# Patient Record
Sex: FEMALE | Race: WHITE | NOT HISPANIC OR LATINO | Employment: PART TIME | URBAN - METROPOLITAN AREA
[De-identification: names, ages, dates, MRNs, and addresses within clinical notes are randomized per-mention and may not be internally consistent; named-entity substitution may affect disease eponyms.]

---

## 2023-10-22 ENCOUNTER — HOSPITAL ENCOUNTER (EMERGENCY)
Facility: HOSPITAL | Age: 28
Discharge: HOME | End: 2023-10-23
Attending: EMERGENCY MEDICINE | Admitting: EMERGENCY MEDICINE
Payer: OTHER MISCELLANEOUS

## 2023-10-22 ENCOUNTER — APPOINTMENT (OUTPATIENT)
Dept: GENERAL RADIOLOGY | Facility: HOSPITAL | Age: 28
End: 2023-10-22
Payer: OTHER MISCELLANEOUS

## 2023-10-22 VITALS
SYSTOLIC BLOOD PRESSURE: 142 MMHG | RESPIRATION RATE: 18 BRPM | HEIGHT: 66 IN | WEIGHT: 225 LBS | DIASTOLIC BLOOD PRESSURE: 99 MMHG | TEMPERATURE: 97.8 F | BODY MASS INDEX: 36.16 KG/M2 | HEART RATE: 77 BPM | OXYGEN SATURATION: 97 %

## 2023-10-22 DIAGNOSIS — S89.92XA LEFT KNEE INJURY, INITIAL ENCOUNTER: Primary | ICD-10-CM

## 2023-10-22 PROCEDURE — 63710000001 ONDANSETRON PER 8 MG: Performed by: EMERGENCY MEDICINE

## 2023-10-22 PROCEDURE — 99283 EMERGENCY DEPT VISIT LOW MDM: CPT

## 2023-10-22 PROCEDURE — 99283 EMERGENCY DEPT VISIT LOW MDM: CPT | Performed by: EMERGENCY MEDICINE

## 2023-10-22 PROCEDURE — 73562 X-RAY EXAM OF KNEE 3: CPT

## 2023-10-22 RX ORDER — HYDROCODONE BITARTRATE AND ACETAMINOPHEN 5; 325 MG/1; MG/1
1 TABLET ORAL ONCE AS NEEDED
Status: DISCONTINUED | OUTPATIENT
Start: 2023-10-22 | End: 2023-10-23 | Stop reason: HOSPADM

## 2023-10-22 RX ORDER — ONDANSETRON 4 MG/1
4 TABLET, FILM COATED ORAL ONCE
Status: COMPLETED | OUTPATIENT
Start: 2023-10-22 | End: 2023-10-22

## 2023-10-22 RX ADMIN — ONDANSETRON HYDROCHLORIDE 4 MG: 4 TABLET, FILM COATED ORAL at 23:34

## 2023-10-22 RX ADMIN — HYDROCODONE BITARTRATE AND ACETAMINOPHEN 1 TABLET: 5; 325 TABLET ORAL at 23:34

## 2023-10-22 NOTE — Clinical Note
[facility] FSED [place] [address] [place] [address] [place] [address]  Phone: [phone]    Anabel Cole was seen and treated in our emergency department on 10/22/2023.  She may return to work on 10/26/2023.         Thank you for choosing [facility].    Vivien Acuna MD

## 2023-10-23 RX ORDER — IBUPROFEN 600 MG/1
600 TABLET ORAL EVERY 8 HOURS PRN
Qty: 15 TABLET | Refills: 0 | Status: SHIPPED | OUTPATIENT
Start: 2023-10-23

## 2023-10-23 NOTE — FSED PROVIDER NOTE
Subjective   History of Present Illness  28 yof complains of left knee pain. The patient reports she slipped at work and landed on her knee. The patient felt a pop. She reports she has had multiple surgeries on her knee before. She denies neck or back injury. She denies numbness or weakness.       Review of Systems   Constitutional: Negative.    Musculoskeletal:  Negative for back pain and neck pain.        Knee pain   Neurological: Negative.  Negative for dizziness, weakness, numbness and headaches.   All other systems reviewed and are negative.      History reviewed. No pertinent past medical history.    Allergies   Allergen Reactions    Amoxicillin Hives and Rash    Penicillins Hives    Doxycycline Nausea And Vomiting and Rash       History reviewed. No pertinent surgical history.    History reviewed. No pertinent family history.    Social History     Socioeconomic History    Marital status: Single   Tobacco Use    Smoking status: Never    Smokeless tobacco: Never   Vaping Use    Vaping Use: Never used   Substance and Sexual Activity    Alcohol use: Never    Drug use: Never    Sexual activity: Defer           Objective   Physical Exam  Vitals reviewed.   Constitutional:       Appearance: Normal appearance.   HENT:      Head: Normocephalic and atraumatic.      Nose: Nose normal.      Mouth/Throat:      Mouth: Mucous membranes are moist.      Pharynx: Oropharynx is clear.   Eyes:      Extraocular Movements: Extraocular movements intact.      Pupils: Pupils are equal, round, and reactive to light.   Cardiovascular:      Rate and Rhythm: Normal rate and regular rhythm.      Pulses: Normal pulses.      Heart sounds: Normal heart sounds.   Pulmonary:      Effort: Pulmonary effort is normal.      Breath sounds: Normal breath sounds.   Musculoskeletal:         General: Swelling and tenderness present. Normal range of motion.      Cervical back: Normal range of motion and neck supple. No tenderness.      Left knee:  Swelling present. No crepitus. Tenderness present. Normal alignment. Normal pulse.   Skin:     General: Skin is warm and dry.      Capillary Refill: Capillary refill takes less than 2 seconds.   Neurological:      General: No focal deficit present.      Mental Status: She is alert and oriented to person, place, and time.      Sensory: No sensory deficit.      Motor: No weakness.         Procedures           ED Course  ED Course as of 10/23/23 0151   Mon Oct 23, 2023   0027 Discussed test results and treatment plan with patient and family.  [BM]      ED Course User Index  [BM] Vivien Acuna MD                                           Medical Decision Making  Workup: XR Knee  Findings: No fracture or dislocation  Presentation most consistent with Knee Sprain.  Patient does not currently demonstrate complications of sprain such as compartment syndrome, arterial or nerve injury.  Disposition: Discharge. Supportive bracing provided. WBAT. RICE. Strict return precautions and instructions to follow up with primary MD within 24-48 hours for further evaluation    Problems Addressed:  Left knee injury, initial encounter: complicated acute illness or injury    Amount and/or Complexity of Data Reviewed  Radiology: ordered.    Risk  Prescription drug management.        Final diagnoses:   Left knee injury, initial encounter       ED Disposition  ED Disposition       ED Disposition   Discharge    Condition   Stable    Comment   --               Freddy Urban MD  [address] [place] [address]  [phone]    Schedule an appointment as soon as possible for a visit in 2 days  re-evaluation         Medication List        New Prescriptions      ibuprofen 600 MG tablet  Commonly known as: ADVIL,MOTRIN  Take 1 tablet by mouth Every 8 (Eight) Hours As Needed (pain).               Where to Get Your Medications        These medications were sent to [facility]/pharmacy #[id number] - [place], [place] - [address]  [place] - [phone]  - [phone] FX  [address] [facility] [place] [address]      Hours: 24-hours Phone: [phone]   ibuprofen 600 MG tablet

## 2023-10-23 NOTE — DISCHARGE INSTRUCTIONS
Rest, ice and elevate your injury. Wear knee immobilizer for 1 week. Weight bearing as tolerated. Follow-up with Dr Urban this week for further evaluation.

## 2023-10-31 ENCOUNTER — OFFICE VISIT (OUTPATIENT)
Dept: ORTHOPEDIC SURGERY | Facility: CLINIC | Age: 28
End: 2023-10-31
Payer: COMMERCIAL

## 2023-10-31 VITALS — WEIGHT: 225 LBS | OXYGEN SATURATION: 99 % | BODY MASS INDEX: 36.16 KG/M2 | RESPIRATION RATE: 20 BRPM | HEIGHT: 66 IN

## 2023-10-31 DIAGNOSIS — M76.50 PATELLAR TENDINOSIS: Primary | ICD-10-CM

## 2023-10-31 NOTE — PROGRESS NOTES
"[facility]        Patient Name: Anabel Cole  : 1995  Primary Care Physician: Provider, No Known        Chief Complaint:    Chief Complaint   Patient presents with    Left Knee - Pain, Initial Evaluation     Pain- 2   Pain started 10/22/23 fell at work         HPI:   Anabel Cole is a 28 y.o. year old who presents today for evaluation of left knee pain.    She has a history of left knee patellar subluxation and bone fragments with surgical repair ( & ).     She recently fell at work. She states she fell onto her bottom but the left knee twisted laterally and she thought she heard a \"crack.\" Following the fall, she had immediate pain and difficulty bearing weight. She has pain near the lateral tibial plateau. She is currently taking Ibuprofen with decent relief. She has some chronic left knee swelling but nothing acute. A few days have passed and her pain is significantly improved. She is able to bear weight without difficulty at this point.             Past Medical/Surgical, Social and Family History:  I have reviewed and/or updated pertinent history as noted in the medical record including:  Past Medical History:   Diagnosis Date    Knee sprain 10/22/23     Past Surgical History:   Procedure Laterality Date    KNEE SURGERY  ,      Social History     Occupational History    Not on file   Tobacco Use    Smoking status: Never    Smokeless tobacco: Never   Vaping Use    Vaping Use: Never used   Substance and Sexual Activity    Alcohol use: Never    Drug use: Never    Sexual activity: Yes     Partners: Male     Birth control/protection: None          Allergies:   Allergies   Allergen Reactions    Amoxicillin Hives and Rash    Penicillins Hives    Doxycycline Nausea And Vomiting and Rash       Medications:   Home Medications:  Current Outpatient Medications on File Prior to Visit   Medication Sig    ibuprofen (ADVIL,MOTRIN) 600 MG tablet Take 1 tablet by mouth Every 8 (Eight) Hours As Needed " (pain). (Patient not taking: Reported on 10/31/2023)     No current facility-administered medications on file prior to visit.         ROS:  Negative unless listed in the HPI    Physical Exam:   28 y.o. female  Body mass index is 36.32 kg/m²., 102 kg (225 lb)  Vitals:    10/31/23 1358   Resp: 20   SpO2: 99%     General: Alert, cooperative, appears well and in no observable distress.   HEENT: Normocephalic, atraumatic on external visual inspection. No icterus.   CV: No significant peripheral edema.    Respiratory: Normal respiratory effort.   Skin: Warm & well perfused; appropriate skin turgor.  Psych: Appropriate mood & affect.  Neuro: Gross sensation and motor intact in affected extremity/extremities.  Vascular: Peripheral pulses palpable in affected extremity/extremities.     Left Knee Exam     Muscle Strength   The patient has normal left knee strength.    Tenderness   The patient is experiencing tenderness in the lateral joint line and patellar tendon.    Range of Motion   Extension:  normal   Flexion:  normal     Tests   Wilfrid:  Medial - negative Lateral - negative  Varus: negative Valgus: negative  Patellar apprehension: trace    Other   Erythema: absent  Sensation: normal  Pulse: present  Swelling: mild               Radiology:  Knee xray 10/22/23    Findings:  There are postoperative changes with 2 surgical screws in the proximal tibia. There is no acute fracture. There is mild tricompartmental arthritic change. There is no joint effusion. There are no lytic or destructive bony lesions.     IMPRESSION:  Impression:  No acute osseous abnormality.    Assessment:  Left knee pain, s/p fall  Patellar tendinosis  Body mass index is 36.32 kg/m².  BMI consistent with Obese Class II: 35-39.9kg/m2      Plan:  Recommend continuation of NSAIDs for pain relief  Rest/ice as needed for comfort  No acute process identified. Pain improving with time. Tibial screws remain intact on imaging. No fractures. Patella in  appropriate position.   BMI reviewed  Follow up PRN  Patient encouraged to call with any questions or concerns in the interim        KIT Sanchez

## 2023-11-02 ENCOUNTER — PATIENT ROUNDING (BHMG ONLY) (OUTPATIENT)
Dept: ORTHOPEDIC SURGERY | Facility: CLINIC | Age: 28
End: 2023-11-02

## 2023-11-03 ENCOUNTER — TELEPHONE (OUTPATIENT)
Dept: ORTHOPEDIC SURGERY | Facility: CLINIC | Age: 28
End: 2023-11-03

## 2023-11-03 NOTE — TELEPHONE ENCOUNTER
Patient called In requesting a letter for workmans comp stating she is release to full duty. Is this appropriate?